# Patient Record
Sex: FEMALE | Race: WHITE | Employment: FULL TIME | URBAN - METROPOLITAN AREA
[De-identification: names, ages, dates, MRNs, and addresses within clinical notes are randomized per-mention and may not be internally consistent; named-entity substitution may affect disease eponyms.]

---

## 2017-05-15 ENCOUNTER — OFFICE VISIT (OUTPATIENT)
Dept: OBGYN CLINIC | Age: 57
End: 2017-05-15
Payer: COMMERCIAL

## 2017-05-15 VITALS
DIASTOLIC BLOOD PRESSURE: 80 MMHG | SYSTOLIC BLOOD PRESSURE: 102 MMHG | WEIGHT: 113.6 LBS | HEIGHT: 62 IN | BODY MASS INDEX: 20.91 KG/M2

## 2017-05-15 DIAGNOSIS — Z12.31 ENCOUNTER FOR SCREENING MAMMOGRAM FOR BREAST CANCER: ICD-10-CM

## 2017-05-15 DIAGNOSIS — Z01.419 ENCOUNTER FOR GYNECOLOGICAL EXAMINATION WITHOUT ABNORMAL FINDING: Primary | ICD-10-CM

## 2017-05-15 LAB — PAP SMEAR: NORMAL

## 2017-05-15 PROCEDURE — 99396 PREV VISIT EST AGE 40-64: CPT | Performed by: NURSE PRACTITIONER

## 2017-05-15 RX ORDER — LANOLIN ALCOHOL/MO/W.PET/CERES
400 CREAM (GRAM) TOPICAL DAILY
COMMUNITY
End: 2021-06-21

## 2017-05-15 RX ORDER — BIOTIN 1 MG
TABLET ORAL
COMMUNITY
End: 2021-06-21

## 2017-05-15 RX ORDER — MULTIVIT WITH MINERALS/LUTEIN
1000 TABLET ORAL DAILY
COMMUNITY
End: 2021-06-21

## 2017-05-15 ASSESSMENT — ENCOUNTER SYMPTOMS
SHORTNESS OF BREATH: 0
ABDOMINAL DISTENTION: 0
ABDOMINAL PAIN: 0
BACK PAIN: 0
CONSTIPATION: 0
COUGH: 0
DIARRHEA: 0

## 2017-05-22 DIAGNOSIS — Z01.419 ENCOUNTER FOR GYNECOLOGICAL EXAMINATION WITHOUT ABNORMAL FINDING: ICD-10-CM

## 2017-06-22 DIAGNOSIS — Z12.31 ENCOUNTER FOR SCREENING MAMMOGRAM FOR BREAST CANCER: ICD-10-CM

## 2018-05-18 ENCOUNTER — OFFICE VISIT (OUTPATIENT)
Dept: OBGYN CLINIC | Age: 58
End: 2018-05-18
Payer: COMMERCIAL

## 2018-05-18 VITALS
HEIGHT: 62 IN | WEIGHT: 109 LBS | SYSTOLIC BLOOD PRESSURE: 94 MMHG | BODY MASS INDEX: 20.06 KG/M2 | DIASTOLIC BLOOD PRESSURE: 62 MMHG

## 2018-05-18 DIAGNOSIS — Z12.31 ENCOUNTER FOR SCREENING MAMMOGRAM FOR BREAST CANCER: ICD-10-CM

## 2018-05-18 DIAGNOSIS — Z01.419 ENCOUNTER FOR GYNECOLOGICAL EXAMINATION: Primary | ICD-10-CM

## 2018-05-18 LAB — PAP SMEAR: NORMAL

## 2018-05-18 PROCEDURE — 99396 PREV VISIT EST AGE 40-64: CPT | Performed by: NURSE PRACTITIONER

## 2018-05-18 ASSESSMENT — ENCOUNTER SYMPTOMS
BACK PAIN: 0
CONSTIPATION: 0
COUGH: 0
DIARRHEA: 0
SHORTNESS OF BREATH: 0
ABDOMINAL PAIN: 0
ABDOMINAL DISTENTION: 0

## 2018-05-29 DIAGNOSIS — Z01.419 ENCOUNTER FOR GYNECOLOGICAL EXAMINATION: ICD-10-CM

## 2018-06-26 DIAGNOSIS — Z12.31 ENCOUNTER FOR SCREENING MAMMOGRAM FOR BREAST CANCER: ICD-10-CM

## 2019-05-30 ENCOUNTER — HOSPITAL ENCOUNTER (OUTPATIENT)
Age: 59
Setting detail: SPECIMEN
Discharge: HOME | End: 2019-05-30
Payer: COMMERCIAL

## 2019-05-30 ENCOUNTER — OFFICE VISIT (OUTPATIENT)
Dept: OBGYN CLINIC | Age: 59
End: 2019-05-30
Payer: COMMERCIAL

## 2019-05-30 VITALS
WEIGHT: 108.8 LBS | SYSTOLIC BLOOD PRESSURE: 92 MMHG | BODY MASS INDEX: 20.02 KG/M2 | DIASTOLIC BLOOD PRESSURE: 74 MMHG | HEIGHT: 62 IN

## 2019-05-30 DIAGNOSIS — Z12.31 ENCOUNTER FOR SCREENING MAMMOGRAM FOR BREAST CANCER: ICD-10-CM

## 2019-05-30 DIAGNOSIS — Z01.419 ENCOUNTER FOR GYNECOLOGICAL EXAMINATION: Primary | ICD-10-CM

## 2019-05-30 PROCEDURE — 99396 PREV VISIT EST AGE 40-64: CPT | Performed by: NURSE PRACTITIONER

## 2019-05-30 ASSESSMENT — ENCOUNTER SYMPTOMS
COUGH: 0
CONSTIPATION: 0
ABDOMINAL DISTENTION: 0
SHORTNESS OF BREATH: 0
ABDOMINAL PAIN: 0
BACK PAIN: 0
DIARRHEA: 0

## 2019-05-30 NOTE — PROGRESS NOTES
HPI:     Jennifer More a 63 y.o. female who presents today for:  Chief Complaint   Patient presents with    Annual Exam     last pap 5/18/18-WNL last mammogram 6/22/18-WNL        HPI  Here for annual exam. Works as a  at [employer] Has 3 children- 23/27/32. Eating healthy and exercising daily  GYNECOLOGICHISTORY:  MenstrualHistory: Patient's last menstrual period was 04/06/2011. Sexually Transmitted Infections: No  History of Ectopic Pregnancy:No  Denies VB  Sexually active: yes  Dyspareunia: none    Current Outpatient Medications   Medication Sig Dispense Refill    vitamin E 1000 UNITS capsule Take 1,000 Units by mouth daily      Biotin 1000 MCG TABS Take by mouth      folic acid (FOLVITE) 284 MCG tablet Take 400 mcg by mouth daily      TURMERIC PO Take by mouth      Omega 3-6-9 Fatty Acids (OMEGA 3-6-9 COMPLEX PO) Take  by mouth.  Probiotic Product (PROBIOTIC PO) Take  by mouth.  Calcium Carbonate-Vitamin D (CALCIUM + D PO) Take  by mouth.  diclofenac sodium (VOLTAREN) 1 % GEL Apply 2 g topically daily.  levothyroxine (LEVOTHROID) 50 MCG tablet Take 50 mcg by mouth daily.  buPROPion (WELLBUTRIN SR) 150 MG SR tablet Take 150 mg by mouth daily.  valacyclovir (VALTREX) 1 GM tablet Take 1,000 mg by mouth 2 times daily. Take as directed        No current facility-administered medications for this visit.       No Known Allergies    Past Medical History:   Diagnosis Date    Abnormal Pap smear 10/12/2001    REAGAN I, mild dysplasia    GERD (gastroesophageal reflux disease)     history of    Hypothyroidism     Migraine     Osteopenia 03/2008     Denies family history of breast, ovarian, uterus, colon CA     Past Surgical History:   Procedure Laterality Date    COLONOSCOPY  02/2017    COLPOSCOPY  12/2001    EYE SURGERY Right 11/2018    cornea replacement     OTHER SURGICAL HISTORY  3/9/90    cervical cone by laser ablation    TOTAL HIP ARTHROPLASTY      Left Hip Family History   Problem Relation Age of Onset    Hypertension Paternal Grandmother     Hypertension Maternal Grandmother     Cancer Father         thyroid    Depression Father     Heart Disease Father     Osteoporosis Mother     High Blood Pressure Mother     High Cholesterol Mother     Hypertension Brother     Breast Cancer Other      Social History     Tobacco Use    Smoking status: Never Smoker    Smokeless tobacco: Never Used   Substance Use Topics    Alcohol use: Yes     Alcohol/week: 1.8 oz     Types: 3 Glasses of wine per week        Subjective:      Review of Systems   Constitutional: Negative for appetite change and fatigue. HENT: Negative for congestion and hearing loss. Eyes: Negative for visual disturbance. Respiratory: Negative for cough and shortness of breath. Cardiovascular: Negative for chest pain and palpitations. Gastrointestinal: Negative for abdominal distention, abdominal pain, constipation and diarrhea. Genitourinary: Negative for flank pain, frequency, menstrual problem, pelvic pain and vaginal discharge. Musculoskeletal: Negative for back pain. Neurological: Negative for syncope and headaches. Psychiatric/Behavioral: Negative for behavioral problems. Objective:     BP 92/74 (Site: Right Upper Arm, Position: Sitting, Cuff Size: Medium Adult)   Ht 5' 1.75\" (1.568 m)   Wt 108 lb 12.8 oz (49.4 kg)   LMP 04/06/2011   Breastfeeding? No   BMI 20.06 kg/m²   Physical Exam   Constitutional: She is oriented to person, place, and time. She appears well-developed and well-nourished. Eyes: Pupils are equal, round, and reactive to light. Neck: No tracheal deviation present. No thyromegaly present. Cardiovascular: Normal rate, regular rhythm and normal heart sounds. Pulmonary/Chest: Effort normal and breath sounds normal. No respiratory distress. Right breast exhibits no inverted nipple.  Left breast exhibits no inverted nipple, no mass, no nipple discharge, no skin change and no tenderness. No breast tenderness, discharge or bleeding. Breasts are symmetrical.   Abdominal: Soft. Bowel sounds are normal. She exhibits no distension and no mass. There is no tenderness. There is no CVA tenderness. Hernia confirmed negative in the right inguinal area and confirmed negative in the left inguinal area. Genitourinary: No breast tenderness, discharge or bleeding. There is no rash or lesion on the right labia. There is no rash or lesion on the left labia. Uterus is not deviated, not enlarged and not fixed. Cervix exhibits no motion tenderness, no discharge and no friability. Right adnexum displays no mass, no tenderness and no fullness. Left adnexum displays no mass, no tenderness and no fullness. No tenderness in the vagina. No vaginal discharge found. Musculoskeletal: She exhibits no edema or tenderness. Lymphadenopathy:     She has no cervical adenopathy. Right: No inguinal adenopathy present. Left: No inguinal adenopathy present. Neurological: She is alert and oriented to person, place, and time. Skin: Skin is warm and dry. Psychiatric: She has a normal mood and affect. Her behavior is normal. Judgment and thought content normal.         Assessment:     1. Encounter for gynecological examination    2. Encounter for screening mammogram for breast cancer          Plan:   1. Pap collected. Discussed new pap smear guidelines. Desires re-pap in 1 year. 2. Screening mammogram discussed and advised yearly if within normal limits. 3. Calcium and Vitamin D dosing reviewed. 4. Colonoscopy screening reviewed. 5. Bone density testing reviewed.      Electronicallysigned by Zack Zimmer on 5/30/2019

## 2019-05-31 ENCOUNTER — TELEPHONE (OUTPATIENT)
Dept: OBGYN CLINIC | Age: 59
End: 2019-05-31

## 2019-05-31 RX ORDER — VALACYCLOVIR HYDROCHLORIDE 1 G/1
1000 TABLET, FILM COATED ORAL 2 TIMES DAILY
Qty: 14 TABLET | Refills: 5 | Status: SHIPPED | OUTPATIENT
Start: 2019-05-31 | End: 2020-07-29

## 2019-05-31 NOTE — TELEPHONE ENCOUNTER
62 y/o GYN seen per Maya on 05/30/19 for her annual.    Pt calling is requesting refill of Valtrex. Pt states she didn't realize she does need refills. Please send to [address].

## 2019-06-04 LAB — CYTOLOGY REPORT: NORMAL

## 2019-08-08 DIAGNOSIS — M25.552 HIP PAIN, LEFT: Primary | ICD-10-CM

## 2019-08-09 ENCOUNTER — OFFICE VISIT (OUTPATIENT)
Dept: ORTHOPEDIC SURGERY | Age: 59
End: 2019-08-09
Payer: COMMERCIAL

## 2019-08-09 DIAGNOSIS — Z96.649 HISTORY OF REVISION OF TOTAL HIP ARTHROPLASTY: Primary | ICD-10-CM

## 2019-08-09 PROCEDURE — G8420 CALC BMI NORM PARAMETERS: HCPCS | Performed by: ORTHOPAEDIC SURGERY

## 2019-08-09 PROCEDURE — 3017F COLORECTAL CA SCREEN DOC REV: CPT | Performed by: ORTHOPAEDIC SURGERY

## 2019-08-09 PROCEDURE — 1036F TOBACCO NON-USER: CPT | Performed by: ORTHOPAEDIC SURGERY

## 2019-08-09 PROCEDURE — G8427 DOCREV CUR MEDS BY ELIG CLIN: HCPCS | Performed by: ORTHOPAEDIC SURGERY

## 2019-08-09 PROCEDURE — 99213 OFFICE O/P EST LOW 20 MIN: CPT | Performed by: ORTHOPAEDIC SURGERY

## 2020-06-12 ENCOUNTER — OFFICE VISIT (OUTPATIENT)
Dept: OBGYN CLINIC | Age: 60
End: 2020-06-12
Payer: COMMERCIAL

## 2020-06-12 VITALS
HEIGHT: 62 IN | SYSTOLIC BLOOD PRESSURE: 106 MMHG | BODY MASS INDEX: 20.28 KG/M2 | DIASTOLIC BLOOD PRESSURE: 74 MMHG | WEIGHT: 110.2 LBS

## 2020-06-12 PROCEDURE — 99396 PREV VISIT EST AGE 40-64: CPT | Performed by: NURSE PRACTITIONER

## 2020-06-12 ASSESSMENT — ENCOUNTER SYMPTOMS
ABDOMINAL DISTENTION: 0
BACK PAIN: 0
SHORTNESS OF BREATH: 0
CONSTIPATION: 0
DIARRHEA: 0
COUGH: 0
ABDOMINAL PAIN: 0

## 2020-06-12 NOTE — PROGRESS NOTES
Relation Age of Onset    Hypertension Paternal Grandmother     Hypertension Maternal Grandmother     Cancer Father         thyroid    Depression Father     Heart Disease Father     Osteoporosis Mother     High Blood Pressure Mother     High Cholesterol Mother     Hypertension Brother     Breast Cancer Other      Social History     Tobacco Use    Smoking status: Never Smoker    Smokeless tobacco: Never Used   Substance Use Topics    Alcohol use: Yes     Alcohol/week: 3.0 standard drinks     Types: 3 Glasses of wine per week        Subjective:      Review of Systems   Constitutional: Negative for appetite change and fatigue. HENT: Negative for congestion and hearing loss. Eyes: Negative for visual disturbance. Respiratory: Negative for cough and shortness of breath. Cardiovascular: Negative for chest pain and palpitations. Gastrointestinal: Negative for abdominal distention, abdominal pain, constipation and diarrhea. Genitourinary: Negative for flank pain, frequency, menstrual problem, pelvic pain and vaginal discharge. Musculoskeletal: Negative for back pain. Neurological: Negative for syncope and headaches. Psychiatric/Behavioral: Negative for behavioral problems. Objective:     /74 (Site: Right Upper Arm, Position: Sitting, Cuff Size: Medium Adult)   Ht 5' 1.75\" (1.568 m)   Wt 110 lb 3.2 oz (50 kg)   LMP 04/06/2011   Breastfeeding No   BMI 20.32 kg/m²   Physical Exam  Constitutional:       Appearance: She is well-developed. Eyes:      Pupils: Pupils are equal, round, and reactive to light. Neck:      Thyroid: No thyromegaly. Trachea: No tracheal deviation. Cardiovascular:      Rate and Rhythm: Normal rate and regular rhythm. Heart sounds: Normal heart sounds. Pulmonary:      Effort: Pulmonary effort is normal. No respiratory distress. Breath sounds: Normal breath sounds.    Chest:      Breasts: Breasts are symmetrical.         Right: No

## 2020-07-07 ENCOUNTER — TELEPHONE (OUTPATIENT)
Dept: OBGYN CLINIC | Age: 60
End: 2020-07-07

## 2020-07-29 RX ORDER — VALACYCLOVIR HYDROCHLORIDE 1 G/1
TABLET, FILM COATED ORAL
Qty: 14 TABLET | Refills: 5 | Status: SHIPPED | OUTPATIENT
Start: 2020-07-29 | End: 2021-06-21 | Stop reason: SDUPTHER

## 2020-10-28 ENCOUNTER — OFFICE VISIT (OUTPATIENT)
Dept: OBGYN CLINIC | Age: 60
End: 2020-10-28
Payer: COMMERCIAL

## 2020-10-28 VITALS
BODY MASS INDEX: 20.06 KG/M2 | DIASTOLIC BLOOD PRESSURE: 70 MMHG | SYSTOLIC BLOOD PRESSURE: 110 MMHG | WEIGHT: 109 LBS | HEIGHT: 62 IN

## 2020-10-28 PROCEDURE — 99213 OFFICE O/P EST LOW 20 MIN: CPT | Performed by: NURSE PRACTITIONER

## 2020-10-28 ASSESSMENT — ENCOUNTER SYMPTOMS
ABDOMINAL DISTENTION: 0
COUGH: 0
CONSTIPATION: 0
SHORTNESS OF BREATH: 0
DIARRHEA: 0
BACK PAIN: 0
ABDOMINAL PAIN: 0

## 2020-10-28 NOTE — PROGRESS NOTES
PO Take by mouth      Omega 3-6-9 Fatty Acids (OMEGA 3-6-9 COMPLEX PO) Take  by mouth.  Probiotic Product (PROBIOTIC PO) Take  by mouth.  Calcium Carbonate-Vitamin D (CALCIUM + D PO) Take  by mouth.  diclofenac sodium (VOLTAREN) 1 % GEL Apply 2 g topically daily.  levothyroxine (LEVOTHROID) 50 MCG tablet Take 50 mcg by mouth daily.  buPROPion (WELLBUTRIN SR) 150 MG SR tablet Take 150 mg by mouth daily. No current facility-administered medications for this visit. No Known Allergies    Objective:   Physical Exam  Constitutional:       Appearance: Normal appearance. She is normal weight. HENT:      Head: Normocephalic. Eyes:      Extraocular Movements: Extraocular movements intact. Conjunctiva/sclera: Conjunctivae normal.   Neck:      Musculoskeletal: Normal range of motion. Pulmonary:      Effort: Pulmonary effort is normal.   Chest:      Breasts:         Right: Normal. No swelling, bleeding, inverted nipple, mass, nipple discharge, skin change or tenderness. Left: Tenderness present. No swelling, bleeding, inverted nipple, mass, nipple discharge or skin change. Abdominal:      General: Abdomen is flat. Palpations: Abdomen is soft. Musculoskeletal: Normal range of motion. General: Tenderness (left axillary) present. Neurological:      General: No focal deficit present. Mental Status: She is alert and oriented to person, place, and time. Mental status is at baseline. Skin:     General: Skin is warm and dry. Psychiatric:         Mood and Affect: Mood normal.         Behavior: Behavior normal.         Thought Content: Thought content normal.         Judgment: Judgment normal.         Assessment:      1. Left axillary pain          Plan:   Left breast US  Warm compresses tid  Consider NSAIDs for a short period of time   RTO annual exam and prn  No follow-ups on file.         Ayse Sheikh, APRN - CNP

## 2021-06-21 ENCOUNTER — OFFICE VISIT (OUTPATIENT)
Dept: OBGYN CLINIC | Age: 61
End: 2021-06-21
Payer: COMMERCIAL

## 2021-06-21 VITALS
DIASTOLIC BLOOD PRESSURE: 60 MMHG | BODY MASS INDEX: 21.79 KG/M2 | SYSTOLIC BLOOD PRESSURE: 94 MMHG | HEIGHT: 60 IN | WEIGHT: 111 LBS

## 2021-06-21 DIAGNOSIS — Z12.31 ENCOUNTER FOR SCREENING MAMMOGRAM FOR BREAST CANCER: ICD-10-CM

## 2021-06-21 DIAGNOSIS — Z01.419 ENCOUNTER FOR GYNECOLOGICAL EXAMINATION: Primary | ICD-10-CM

## 2021-06-21 PROBLEM — M21.942 ACQUIRED DEFORMITY OF BOTH HANDS: Status: ACTIVE | Noted: 2019-06-12

## 2021-06-21 PROBLEM — M93.90 OSTEOCHONDROPATHY: Status: ACTIVE | Noted: 2021-06-21

## 2021-06-21 PROBLEM — M21.941 ACQUIRED DEFORMITY OF BOTH HANDS: Status: ACTIVE | Noted: 2019-06-12

## 2021-06-21 PROBLEM — D72.819 CHRONIC LEUKOPENIA: Status: ACTIVE | Noted: 2020-04-22

## 2021-06-21 PROBLEM — E87.6 HYPOKALEMIA: Status: ACTIVE | Noted: 2021-06-21

## 2021-06-21 LAB — PAP SMEAR: NORMAL

## 2021-06-21 PROCEDURE — 99396 PREV VISIT EST AGE 40-64: CPT | Performed by: NURSE PRACTITIONER

## 2021-06-21 RX ORDER — VALACYCLOVIR HYDROCHLORIDE 1 G/1
TABLET, FILM COATED ORAL
Qty: 14 TABLET | Refills: 5 | Status: SHIPPED | OUTPATIENT
Start: 2021-06-21

## 2021-06-21 ASSESSMENT — ENCOUNTER SYMPTOMS
ABDOMINAL PAIN: 0
BACK PAIN: 0
COUGH: 0
SHORTNESS OF BREATH: 0
CONSTIPATION: 0
ABDOMINAL DISTENTION: 0
DIARRHEA: 0

## 2021-06-21 NOTE — PROGRESS NOTES
HPI:     Trevor Bacon a 62 y.o. female who presents today for:  Chief Complaint   Patient presents with    Annual Exam     last pap 6/12/20-WNL last mammogram 7/22/20-WNL        HPI  Here for annual exam. Works as a  at [employer] in [address] [place] [address]. Has 3 children- 34/30/25. Exercises and eating very healthy. Osteoporosis managed by Dr. Murl Spurling. Mom had BrCa- negative BRCA. GYNECOLOGICHISTORY:  MenstrualHistory: Patient's last menstrual period was 04/06/2011. Sexually Transmitted Infections: No  History of Ectopic Pregnancy:No  Denies VB  Sexually active: not currently  Dyspareunia: NA    Current Outpatient Medications   Medication Sig Dispense Refill    valACYclovir (VALTREX) 1 g tablet TAKE ONE TABLET BY MOUTH TWICE DAILY AS DIRECTED FOR SEVEN DAYS 14 tablet 5    vitamin E 1000 UNITS capsule Take 1,000 Units by mouth daily      Biotin 1000 MCG TABS Take by mouth      folic acid (FOLVITE) 176 MCG tablet Take 400 mcg by mouth daily      TURMERIC PO Take by mouth      Omega 3-6-9 Fatty Acids (OMEGA 3-6-9 COMPLEX PO) Take  by mouth.  Probiotic Product (PROBIOTIC PO) Take  by mouth.  Calcium Carbonate-Vitamin D (CALCIUM + D PO) Take  by mouth.  diclofenac sodium (VOLTAREN) 1 % GEL Apply 2 g topically daily.  levothyroxine (LEVOTHROID) 50 MCG tablet Take 50 mcg by mouth daily.  buPROPion (WELLBUTRIN SR) 150 MG SR tablet Take 150 mg by mouth daily. No current facility-administered medications for this visit.      No Known Allergies    Past Medical History:   Diagnosis Date    Abnormal Pap smear 10/12/2001    REAGAN I, mild dysplasia    GERD (gastroesophageal reflux disease)     history of    Hypothyroidism     Migraine     Osteopenia 03/2008     Denies family history of breast, ovarian, uterus, colon CA     Past Surgical History:   Procedure Laterality Date    COLONOSCOPY  02/2017    COLPOSCOPY  12/2001    EYE SURGERY Right 11/2018    cornea discharge, skin change or tenderness. Abdominal:      General: There is no distension. Palpations: Abdomen is soft. There is no mass. Tenderness: There is no abdominal tenderness. Genitourinary:     Labia:         Right: No rash or lesion. Left: No rash or lesion. Vagina: No vaginal discharge or tenderness. Cervix: No cervical motion tenderness, discharge or friability. Uterus: Not deviated, not enlarged and not fixed. Adnexa:         Right: No mass, tenderness or fullness. Left: No mass, tenderness or fullness. Musculoskeletal:         General: No tenderness. Normal range of motion. Cervical back: Normal range of motion. Skin:     General: Skin is warm and dry. Neurological:      General: No focal deficit present. Mental Status: She is alert and oriented to person, place, and time. Mental status is at baseline. Psychiatric:         Mood and Affect: Mood normal.         Behavior: Behavior normal.         Thought Content: Thought content normal.         Judgment: Judgment normal.           Assessment:     1. Encounter for gynecological examination    2. Encounter for screening mammogram for breast cancer          Plan:   1. Pap collected. Discussed new pap smear guidelines. Desires re-pap in 1 year. 2. Screening mammogram discussed and advised yearly if within normal limits. 3. Calcium and Vitamin D dosing reviewed. 4. Colonoscopy screening reviewed. 5. Bone density testing reviewed.      Electronicallysigned by RODRIGO Coleman CNP on 6/21/2021

## 2021-06-24 DIAGNOSIS — Z01.419 ENCOUNTER FOR GYNECOLOGICAL EXAMINATION: ICD-10-CM

## 2021-08-17 DIAGNOSIS — Z12.31 ENCOUNTER FOR SCREENING MAMMOGRAM FOR BREAST CANCER: ICD-10-CM

## 2021-12-07 NOTE — PROGRESS NOTES
Subjective:      Patient ID: Qasim Hammer is being seen today for No chief complaint on file. HPI  Here for concerns about ovarian cancer. Has been having some GI issues- indigestion, constipation, diarrhea. Had been previously dx w/IBS. Had been on fosamax and Dr. Tonny Fry is changing to Prolia. Has VB twice in the last 2 months. Was a pink streak on her toilet paper. Has seen GI and had recommended endoscopy, which she didn't want to do. Has been eating well- plenty of fiber and water and stays very active. Discussed pelvic US w/EMB to follow for VB and will evaluate ovaries. Review of Systems   Constitutional: Negative for appetite change and fatigue. HENT: Negative for congestion and hearing loss. Eyes: Negative for visual disturbance. Respiratory: Negative for cough and shortness of breath. Cardiovascular: Negative for chest pain and palpitations. Gastrointestinal: Positive for abdominal distention, constipation and diarrhea. Negative for abdominal pain. Genitourinary: Positive for vaginal bleeding (x 2). Negative for flank pain, frequency, menstrual problem, pelvic pain and vaginal discharge. Musculoskeletal: Negative for back pain. Neurological: Negative for syncope and headaches. Psychiatric/Behavioral: Negative for behavioral problems. There were no vitals filed for this visit. Current Outpatient Medications   Medication Sig Dispense Refill    valACYclovir (VALTREX) 1 g tablet TAKE ONE TABLET BY MOUTH TWICE DAILY AS DIRECTED FOR SEVEN DAYS 14 tablet 5    TURMERIC PO Take by mouth      Omega 3-6-9 Fatty Acids (OMEGA 3-6-9 COMPLEX PO) Take  by mouth.  Probiotic Product (PROBIOTIC PO) Take  by mouth.  Calcium Carbonate-Vitamin D (CALCIUM + D PO) Take  by mouth.  levothyroxine (LEVOTHROID) 50 MCG tablet Take 50 mcg by mouth daily.  buPROPion (WELLBUTRIN SR) 150 MG SR tablet Take 150 mg by mouth daily.          No current facility-administered medications for this visit. No Known Allergies  Patient Active Problem List   Diagnosis    History of PCR DNA positive for HSV1    Depression    Foot neuroma    Hypothyroid    Acquired deformity of both hands    Chronic leukopenia    Hypokalemia    Keratoconus of both eyes    Nuclear cataract of both eyes    Osteochondropathy        Objective:   Physical Exam  Constitutional:       Appearance: Normal appearance. She is normal weight. HENT:      Head: Normocephalic. Eyes:      Extraocular Movements: Extraocular movements intact. Conjunctiva/sclera: Conjunctivae normal.   Pulmonary:      Effort: Pulmonary effort is normal.   Abdominal:      General: Abdomen is flat. Palpations: Abdomen is soft. Musculoskeletal:         General: Normal range of motion. Cervical back: Normal range of motion. Neurological:      General: No focal deficit present. Mental Status: She is alert and oriented to person, place, and time. Mental status is at baseline. Skin:     General: Skin is warm and dry. Psychiatric:         Mood and Affect: Mood normal.         Behavior: Behavior normal.         Thought Content: Thought content normal.         Judgment: Judgment normal.         Assessment:      1. Abdominal bloating    2. Vaginal bleeding          Plan:      Patient is a 62 y.o. [id number]  seen with total face to face time of 15 minutes. More than 50% of this visit was on counseling and education regarding her    Diagnosis Orders   1. Abdominal bloating  US PELVIS COMPLETE   2. Vaginal bleeding  US PELVIS COMPLETE    and her options. She was also counseled on her preventative health maintenance recommendations and follow-up of VB. Refer to plan and assessment.     Recent Results (from the past 8736 hour(s))   PAP SMEAR    Collection Time: 06/21/21 12:00 AM   Result Value Ref Range    Pap Negative for intraephithelial lesion or malignancy Negative for intraephithelial lesion or malignancy, Other PLAN:   Pelvic US  EMB  FU w/PCP and Gi

## 2021-12-08 ENCOUNTER — OFFICE VISIT (OUTPATIENT)
Dept: OBGYN CLINIC | Age: 61
End: 2021-12-08
Payer: COMMERCIAL

## 2021-12-08 VITALS
SYSTOLIC BLOOD PRESSURE: 96 MMHG | DIASTOLIC BLOOD PRESSURE: 68 MMHG | WEIGHT: 113 LBS | HEIGHT: 62 IN | BODY MASS INDEX: 20.8 KG/M2

## 2021-12-08 DIAGNOSIS — N95.0 PMB (POSTMENOPAUSAL BLEEDING): ICD-10-CM

## 2021-12-08 DIAGNOSIS — R14.0 ABDOMINAL BLOATING: Primary | ICD-10-CM

## 2021-12-08 DIAGNOSIS — N93.9 VAGINAL BLEEDING: ICD-10-CM

## 2021-12-08 PROBLEM — M81.0 POST-MENOPAUSAL OSTEOPOROSIS: Status: ACTIVE | Noted: 2021-08-17

## 2021-12-08 PROCEDURE — 99213 OFFICE O/P EST LOW 20 MIN: CPT | Performed by: NURSE PRACTITIONER

## 2021-12-08 ASSESSMENT — ENCOUNTER SYMPTOMS
CONSTIPATION: 1
BACK PAIN: 0
ABDOMINAL PAIN: 0
COUGH: 0
ABDOMINAL DISTENTION: 1
SHORTNESS OF BREATH: 0
DIARRHEA: 1

## 2021-12-08 NOTE — PATIENT INSTRUCTIONS
Patient Education        Endometrial Biopsy: About This Test  What is it? An endometrial biopsy is a way for your doctor to take a small sample of the lining of the uterus (endometrium). The sample is looked at under a microscope for abnormal cells. An endometrial biopsy helps your doctor find problems in the endometrium. Why is this test done? An endometrial biopsy is done to check for cancer of the uterus. The test is also done if you have abnormal bleeding from your uterus. The test results show how your body's hormones are affecting the lining of the uterus, such as during menopause. How do you prepare for the test?  · If you take aspirin or some other blood thinner, ask your doctor if you should stop taking it before your test. Make sure that you understand exactly what your doctor wants you to do. These medicines increase the risk of bleeding. · Ask your doctor if you should take a pain reliever, such as ibuprofen (Advil or Motrin), 30 to 60 minutes before the test. This can help reduce any cramping pain that the test can cause. How is the test done? · You will lie on an exam table. Your feet will be in stirrups. · The doctor may use a spray or injection to numb your cervix. The cervix is the opening to the uterus. · The doctor will use a tool called a speculum to see the cervix. · Then the doctor will pass a thin tube through the cervix to take a sample of the uterus lining. · The sample is sent to a lab. How long does the test take? The test will take about 5 to 15 minutes. What happens after the test?  · You will probably be able to go home right away. · You likely will have mild vaginal bleeding and may have cramps for a few days after the test. The cramps may feel like bad menstrual cramps. How can you care for yourself at home? · Ask your doctor if you can take an over-the-counter pain medicine, such as acetaminophen (Tylenol), ibuprofen (Advil, Motrin), or naproxen (Aleve).  Be safe with medicines. Read and follow all instructions on the label. · Use pads for vaginal bleeding or discharge. · You may return to all your usual activities (including sex) when you feel like it. Follow-up care is a key part of your treatment and safety. Be sure to make and go to all appointments, and call your doctor if you are having problems. It's also a good idea to keep a list of the medicines you take. Ask your doctor when you can expect to have your test results. Where can you learn more? Go to [email or web address] org and sign in to your [facility] account. Enter [phone] in the [employer] box to learn more about \"Endometrial Biopsy: About This Test.\"     If you do not have an account, please click on the \"Sign Up Now\" link. Current as of: February 11, 2021               Content Version: 13.0  © 1855-7589 [employer]. Care instructions adapted under license by [facility] ([facility]). If you have questions about a medical condition or this instruction, always ask your healthcare professional. [place] [address] any warranty or liability for your use of this information.

## 2022-01-21 ENCOUNTER — OFFICE VISIT (OUTPATIENT)
Dept: ORTHOPEDIC SURGERY | Age: 62
End: 2022-01-21
Payer: COMMERCIAL

## 2022-01-21 DIAGNOSIS — M25.552 LEFT HIP PAIN: Primary | ICD-10-CM

## 2022-01-21 PROCEDURE — 99213 OFFICE O/P EST LOW 20 MIN: CPT | Performed by: ORTHOPAEDIC SURGERY

## 2022-01-21 NOTE — PROGRESS NOTES
Alea Lagos M.D.            [address] [facility] [address] [facility],[address], [address] [facility]           Dept Phone: [phone]           Dept Fax:  [phone], [place]          Dept Phone: [phone]           Dept Fax:  [phone]      Chief Compliant:  Chief Complaint   Patient presents with    Pain     Lt hip        History of Present Illness: This is a 62 y.o. female who presents to the clinic today for evaluation / follow up of 20 years status post left total hip for acetabular dysplasia. Patient states that she has very occasional disc comfort but basically 90% of time she has no pain whatsoever she is very active and is doing all her daily activities. .       Review of Systems   Constitutional: Negative for fever, chills, sweats. Eyes: Negative for changes in vision, or pain. HENT: Negative for ear ache, epistaxis, or sore throat. Respiratory/Cardio: Negative for Chest pain, palpitations, SOB, or cough. Gastrointestinal: Negative for abdominal pain, N/V/D. Genitourinary: Negative for dysuria, frequency, urgency, or hematuria. Neurological: Negative for headache, numbness, or weakness. Integumentary: Negative for rash, itching, laceration, or abrasion. Musculoskeletal: Positive for Pain (Lt hip)       Physical Exam:  Constitutional: Patient is oriented to person, place, and time. Patient appears well-developed and well nourished. HENT: Negative otherwise noted  Head: Normocephalic and Atraumatic  Nose: Normal  Eyes: Conjunctivae and EOM are normal  Neck: Normal range of motion Neck supple. Respiratory/Cardio: Effort normal. No respiratory distress. Musculoskeletal:    Notes I can motion the patient's to the left hip indiscriminately without any discomfort whatsoever.   She has no pain on internal or external rotation has no pain on [place] she has no trochanteric findings overall very benign examination  Neurological: Patient is alert and oriented to person, place, and time. Normal strenght. No sensory deficit. Skin: Skin is warm and dry  Psychiatric: Behavior is normal. Thought content normal.  Nursing note and vitals reviewed. Labs and Imaging:     XR taken today:  XR HIP 1 VW W PELVIS LEFT    Result Date: 1/21/2022  X-rays taken today reviewed by me show standing AP of the pelvis. Patient status post left total of arthroplasty. Patient has a noncemented stem that appears to be stable when compared with views from taken in 2019. She has a single cerclage wire. Patient does have some modest femoral head E centricity somewhat superiorly however this again remains unchanged from those views taken 2019. Patient also has acetabular cyst with the surrounding the screws and this is not expansile or progressive in any way          Orders Placed This Encounter   Procedures    XR HIP 1 VW W PELVIS LEFT     Standing Status:   Future     Number of Occurrences:   1     Standing Expiration Date:   1/21/2023       Assessment and Plan:  1. Left hip pain            This is a 62 y.o. female who presents to the clinic today for evaluation / follow up of 20 years status post left total hip. Past History:    Current Outpatient Medications:     valACYclovir (VALTREX) 1 g tablet, TAKE ONE TABLET BY MOUTH TWICE DAILY AS DIRECTED FOR SEVEN DAYS, Disp: 14 tablet, Rfl: 5    TURMERIC PO, Take by mouth, Disp: , Rfl:     Omega 3-6-9 Fatty Acids (OMEGA 3-6-9 COMPLEX PO), Take  by mouth., Disp: , Rfl:     Probiotic Product (PROBIOTIC PO), Take  by mouth., Disp: , Rfl:     Calcium Carbonate-Vitamin D (CALCIUM + D PO), Take  by mouth., Disp: , Rfl:     levothyroxine (LEVOTHROID) 50 MCG tablet, Take 50 mcg by mouth daily. , Disp: , Rfl:     buPROPion (WELLBUTRIN SR) 150 MG SR tablet, Take 150 mg by mouth daily.   , Disp: , Rfl:   Allergies Allergen Reactions    Mixed Ragweed     Molds & Smuts      Social History     Socioeconomic History    Marital status: Single     Spouse name: Not on file    Number of children: Not on file    Years of education: Not on file    Highest education level: Not on file   Occupational History    Not on file   Tobacco Use    Smoking status: Never Smoker    Smokeless tobacco: Never Used   Vaping Use    Vaping Use: Never used   Substance and Sexual Activity    Alcohol use: Yes     Alcohol/week: 3.0 standard drinks     Types: 3 Glasses of wine per week    Drug use: No    Sexual activity: Yes     Partners: Male   Other Topics Concern    Not on file   Social History Narrative    Not on file     Social Determinants of Health     Financial Resource Strain:     Difficulty of Paying Living Expenses: Not on file   Food Insecurity:     Worried About Running Out of Food in the Last Year: Not on file    Jitendra of Food in the Last Year: Not on file   Transportation Needs:     Lack of Transportation (Medical): Not on file    Lack of Transportation (Non-Medical):  Not on file   Physical Activity:     Days of Exercise per Week: Not on file    Minutes of Exercise per Session: Not on file   Stress:     Feeling of Stress : Not on file   Social Connections:     Frequency of Communication with Friends and Family: Not on file    Frequency of Social Gatherings with Friends and Family: Not on file    Attends Cheondoism Services: Not on file    Active Member of Clubs or Organizations: Not on file    Attends Club or Organization Meetings: Not on file    Marital Status: Not on file   Intimate Partner Violence:     Fear of Current or Ex-Partner: Not on file    Emotionally Abused: Not on file    Physically Abused: Not on file    Sexually Abused: Not on file   Housing Stability:     Unable to Pay for Housing in the Last Year: Not on file    Number of [place] in the Last Year: Not on file    Unstable Housing in the Last Year: Not on file     Past Medical History:   Diagnosis Date    Abnormal Pap smear 10/12/2001    REAGAN I, mild dysplasia    GERD (gastroesophageal reflux disease)     history of    Hypothyroidism     Migraine     Osteopenia 03/2008     Past Surgical History:   Procedure Laterality Date    COLONOSCOPY  02/2017    COLPOSCOPY  12/2001    EYE SURGERY Right 11/2018    cornea replacement     OTHER SURGICAL HISTORY  3/9/90    cervical cone by laser ablation    TOTAL HIP ARTHROPLASTY      Left Hip     Family History   Problem Relation Age of Onset    Hypertension Paternal Grandmother     Hypertension Maternal Grandmother     Cancer Father         thyroid    Depression Father     Heart Disease Father     Osteoporosis Mother     High Blood Pressure Mother     High Cholesterol Mother     Hypertension Brother     Breast Cancer Other    Plan  I did inform the patient that I do not see any progressive change in the polyethylene wear those views taken in 2019 to those did today. That she overall is doing well symptomatically. If she becomes symptomatic then she is to let me know otherwise she should follow her up in annual basis as I did explain to her at some point in future were probably looking at a possible polyethylene liner exchange      Provider Attestation:  Tremaine Brown, personally performed the services described in this documentation. All medical record entries made by the scribe were at my direction and in my presence. I have reviewed the chart and discharge instructions and agree that the records reflect my personal performance and is accurate and complete. Jessica Olvera MD. 01/21/22      Please note that this chart was generated using voice recognition Dragon dictation software. Although every effort was made to ensure the accuracy of this automated transcription, some errors in transcription may have occurred.

## 2022-07-23 NOTE — PROGRESS NOTES
DATE OF VISIT:  22        History and Physical    Ambrosio Paulson    :  1960  CHIEF COMPLAINT:    Chief Complaint   Patient presents with    Annual Exam     Here for annual Last pap 19 neg last mammogram 21 neg  seen [facility] in the [employer]                   HPI :   Ambrosio Paulson is a 62 y.o. Aisha Padmini [address]           PCP:RODRIGO Gutierrez - NANNETTE      Ambrosio Paulson returns today for her annual exam.  She is a former patient of Arie Coyne CNP who was last seen 2021 with PMB. TVS was performed and EMB was never obtained? She is having regular bowel movements without constipation or diarrhea. She has some minor involuntary loss of urine and does do Kegel exercises regularly. She is otherwise without any significant complaints today. Kirit Carcamo is currently employed at [employer] as a  in the [employer]. She has received her COVID vacs x2 plus booster. Carrie Cardona is due for her mammogram and she will be seeing her endocrinologist who prescribes her Fosamax and is going to order a follow-up DEXA scan. EXAMINATION:   TRANSABDOMINAL AND TRANSVAGINAL PELVIC ULTRASOUND       2021       TECHNIQUE:   Transabdominal and transvaginal pelvic ultrasound was performed. COMPARISON:   None       HISTORY:   ORDERING SYSTEM PROVIDED HISTORY: Abdominal bloating   TECHNOLOGIST PROVIDED HISTORY:   This procedure can be scheduled via [employer]. Access your [employer] account by   visiting [email or web address].   VB x 2       FINDINGS:       Measurements:       Uterus: Normal in size for age measuring 5.8 x 4.0 x 2.3 cm (28 cubic   centimetres). Endometrial stripe: Normal in thickness for age measuring 1.9 mm. Right Ovary:Normal in size measuring 2.0 x 1.5 x 1.2 cm (1.8 cubic   centimetres). Left Ovary: Normal in size measuring 1.5 x 1.5 x 1.4 cm) 1.6 cubic   centimetres) with a simple appearing follicle measuring 9.5 x 6.8 mm. Ultrasound Findings:       Uterus: Uterus demonstrates normal myometrial echotexture. Endometrial stripe: Endometrial stripe is within normal limits. Right Ovary: Right ovary is within normal limits. Left Ovary:  Left ovary is within normal limits. Free Fluid: No evidence of free fluid. Impression   Unremarkable pelvic ultrasound. RECOMMENDATIONS:   Unavailable     _____________________________________________________________________  Past Medical History:   Diagnosis Date    Abnormal Pap smear 10/12/2001    REAGAN I, mild dysplasia    GERD (gastroesophageal reflux disease)     history of    Hypothyroidism     Migraine     Osteopenia 03/2008                                                                   Past Surgical History:   Procedure Laterality Date    COLONOSCOPY  02/2017    COLPOSCOPY  12/2001    EYE SURGERY Right 11/2018    cornea replacement     OTHER SURGICAL HISTORY  3/9/90    cervical cone by laser ablation    TOTAL HIP ARTHROPLASTY      Left Hip     Family History   Problem Relation Age of Onset    Hypertension Paternal Grandmother     Hypertension Maternal Grandmother     Cancer Father         thyroid    Depression Father     Heart Disease Father     Osteoporosis Mother     High Blood Pressure Mother     High Cholesterol Mother     Hypertension Brother     Breast Cancer Other      Social History     Tobacco Use   Smoking Status Never   Smokeless Tobacco Never     Social History     Substance and Sexual Activity   Alcohol Use Yes    Alcohol/week: 3.0 standard drinks    Types: 3 Glasses of wine per week     Current Outpatient Medications   Medication Sig Dispense Refill    valACYclovir (VALTREX) 1 g tablet TAKE ONE TABLET BY MOUTH TWICE DAILY AS DIRECTED FOR SEVEN DAYS 14 tablet 5    TURMERIC PO Take by mouth      Omega 3-6-9 Fatty Acids (OMEGA 3-6-9 COMPLEX PO) Take  by mouth. Probiotic Product (PROBIOTIC PO) Take  by mouth.       Calcium Carbonate-Vitamin D (CALCIUM + D PO) Take  by mouth.      levothyroxine (LEVOTHROID) 50 MCG tablet Take 50 mcg by mouth daily. buPROPion (WELLBUTRIN SR) 150 MG SR tablet Take 150 mg by mouth daily. No current facility-administered medications for this visit. Allergies: Allergies   Allergen Reactions    Mixed Ragweed     Molds & Smuts        Gynecologic History:  Patient's last menstrual period was 2011. Sexually Active: Yes  STD History: No  Abnormal Pap History yes  Birth Control: No    OB History    Para Term  AB Living   5 3 0 0 2 3   SAB IAB Ectopic Molar Multiple Live Births   0 0 0 0 0 3     ______________________________________________________________________  REVIEW OF SYSTEMS:        Constitutional:  Unexpected weight change no  Neurological:  Frequent headaches  no  Ophthalmic:  Recent visual changes no  ENT:   Difficulty swallowing  no  Breast:              Masses   no     Respiratory:  Shortness of breath  no    Cardiovascular: Chest pain   no     Gastrointestinal: Chronic diarrhea/constipation no   Urogenital:  Urinary incontinence  yes                                         Heavy/irregular periods           no                                      Vaginal discharge                   no  Hematological: Bruises easy   no     Endocrine:  Nipple Discharge  no     Hot/Cold Intolerance  no   Psychological:  Mood and affect were wnl yes                                                                                                                                           Physical Exam:    There were no vitals filed for this visit. General Appearance:  She does not appear to be in any distress. This  is a well developed, well nourished, well groomed female. Neurological:  The patient is alert and oriented to time, place, person, and situation without any noted sensory motor deficits. Skin:  A brief inspection of the skin revealed no rashes or lesions.      Neck:  The neck was supple. There is no tracheal deviation, thyromegaly or supraclavicular adenopathy appreciated. Breast:   The patients breasts were symmetrical.  Breasts are nontender and there  were no masses, discharge or pathologic skin changes. There is no supraclavicular or axillary adenopathy bilaterally. Respiratory: There was unlabored respiratory effort. Lungs clear to ascultation without wheezes, rales or rhonchi in all fields bilaterally. Cardiovascular:  Normal sinus rhythm with a regular rate and without murmur, rubs or gallops. Abdomen: The abdomen was soft and non-tender with no guarding, rebound, CVAT or rigidity. No hernias were appreciated. Bowel sounds were normally active. Pelvic exam:  No vulvar, vaginal or cervical lesions are noted. Normal vaginal discharge present, no significant cystocele, rectocele or enterocele noted. Uterus nongravid and without CMT and adnexa nontender and without abnormal masses bilaterally. Extremities:  FROM and nontender without clubbing cyanosis or edema. ASSESSMENT:         ICD-10-CM    1. Encounter for gynecological examination without abnormal finding  Z01.419       2. Visit for screening mammogram  Z12.31                       PLAN:    Return to the office in 1 year or when necessary    Liliana Alonso M.D., Joshua Ray. [facility]  Lauren

## 2022-07-23 NOTE — PATIENT INSTRUCTIONS
Please get your mammogram done as scheduled. We will contact you with that result as well as today's Pap smear. Return to the office in 1 year or as needed. Safe travels this summer and have a wonderful year!

## 2022-07-25 ENCOUNTER — OFFICE VISIT (OUTPATIENT)
Dept: OBGYN CLINIC | Age: 62
End: 2022-07-25
Payer: COMMERCIAL

## 2022-07-25 VITALS — DIASTOLIC BLOOD PRESSURE: 70 MMHG | BODY MASS INDEX: 20.12 KG/M2 | SYSTOLIC BLOOD PRESSURE: 122 MMHG | WEIGHT: 110 LBS

## 2022-07-25 DIAGNOSIS — Z86.19 HISTORY OF PCR DNA POSITIVE FOR HSV1: ICD-10-CM

## 2022-07-25 DIAGNOSIS — Z01.419 ENCOUNTER FOR GYNECOLOGICAL EXAMINATION WITHOUT ABNORMAL FINDING: Primary | ICD-10-CM

## 2022-07-25 DIAGNOSIS — Z12.31 VISIT FOR SCREENING MAMMOGRAM: ICD-10-CM

## 2022-07-25 PROCEDURE — 99396 PREV VISIT EST AGE 40-64: CPT | Performed by: OBSTETRICS & GYNECOLOGY

## 2022-07-25 RX ORDER — ALENDRONATE SODIUM 70 MG/1
70 TABLET ORAL
COMMUNITY

## 2022-08-02 DIAGNOSIS — Z01.419 ENCOUNTER FOR GYNECOLOGICAL EXAMINATION WITHOUT ABNORMAL FINDING: ICD-10-CM

## 2022-09-13 DIAGNOSIS — Z12.31 VISIT FOR SCREENING MAMMOGRAM: ICD-10-CM

## 2023-01-27 ENCOUNTER — OFFICE VISIT (OUTPATIENT)
Dept: ORTHOPEDIC SURGERY | Age: 63
End: 2023-01-27
Payer: COMMERCIAL

## 2023-01-27 DIAGNOSIS — M25.559 HIP PAIN: Primary | ICD-10-CM

## 2023-01-27 PROCEDURE — G8484 FLU IMMUNIZE NO ADMIN: HCPCS | Performed by: ORTHOPAEDIC SURGERY

## 2023-01-27 PROCEDURE — 99213 OFFICE O/P EST LOW 20 MIN: CPT | Performed by: ORTHOPAEDIC SURGERY

## 2023-01-27 PROCEDURE — 3017F COLORECTAL CA SCREEN DOC REV: CPT | Performed by: ORTHOPAEDIC SURGERY

## 2023-01-27 PROCEDURE — 1036F TOBACCO NON-USER: CPT | Performed by: ORTHOPAEDIC SURGERY

## 2023-01-27 PROCEDURE — G8428 CUR MEDS NOT DOCUMENT: HCPCS | Performed by: ORTHOPAEDIC SURGERY

## 2023-01-27 PROCEDURE — G8420 CALC BMI NORM PARAMETERS: HCPCS | Performed by: ORTHOPAEDIC SURGERY

## 2023-01-27 NOTE — PROGRESS NOTES
Shira Rubi M.D.            [address] [place] [address], [address] [facility], [address] [facility]           Dept Phone: [phone]           Dept Fax:  [phone]           [place]          Dept Phone: [phone]           Dept Fax:  [phone]      Chief Compliant:  Chief Complaint   Patient presents with    Pain     Lt ASI 1 YR        History of Present Illness: This is a 65 y.o. female who presents to the clinic today for evaluation / follow up of left total hip x21 years. Patient is a 16-year-old female who had a left total of arthroplasty performed 21 years ago for acetabular dysplasia. She she is following on a routine basis to assess for any polyethylene wear she states that she minimally has any pain or discomfort still remains very active in the gym. Review of Systems   Constitutional: Negative for fever, chills, sweats. Eyes: Negative for changes in vision, or pain. HENT: Negative for ear ache, epistaxis, or sore throat. Respiratory/Cardio: Negative for Chest pain, palpitations, SOB, or cough. Gastrointestinal: Negative for abdominal pain, N/V/D. Genitourinary: Negative for dysuria, frequency, urgency, or hematuria. Neurological: Negative for headache, numbness, or weakness. Integumentary: Negative for rash, itching, laceration, or abrasion. Musculoskeletal: Positive for Pain (Lt ASI 1 YR)       Physical Exam:  Constitutional: Patient is oriented to person, place, and time. Patient appears well-developed and well nourished. HENT: Negative otherwise noted  Head: Normocephalic and Atraumatic  Nose: Normal  Eyes: Conjunctivae and EOM are normal  Neck: Normal range of motion Neck supple. Respiratory/Cardio: Effort normal. No respiratory distress.   Musculoskeletal: Physical examination [place] motion the patient's hip indiscriminately without any discomfort whatsoever. She has internal and external rotation without any pain on KATELYNN or FADIR. She has good active strength no leg length discrepancy. Neurological: Patient is alert and oriented to person, place, and time. Normal strength. No sensory deficit. Skin: Skin is warm and dry  Psychiatric: Behavior is normal. Thought content normal.  Nursing note and vitals reviewed. Labs and Imaging:     XR taken today:  XR PELVIS (1-2 VIEWS)    Result Date: 1/27/2023  Trays taken today reviewed by me show AP of the pelvis. Patient status post left total hip arthroplasty with a cerclage cable and 2 acetabular screws. Patient has slight femoral head E centricity but there is been no essential change in today's films versus that a year ago. There is no evidence for loosening of the femoral component. Patient does have a some cyst associated with the screws of the acetabulum but this is nonprogressive from views taken a year ago          Orders Placed This Encounter   Procedures    XR PELVIS (1-2 VIEWS)     Standing Status:   Future     Number of Occurrences:   1     Standing Expiration Date:   1/27/2024       Assessment and Plan:  21-year status post left total hip with minimal polyethylene wear        This is a 65 y.o. female who presents to the clinic today for evaluation / follow up of 1 year status post left total hip.      Past History:    Current Outpatient Medications:     alendronate (FOSAMAX) 70 MG tablet, Take 70 mg by mouth every 7 days, Disp: , Rfl:     valACYclovir (VALTREX) 1 g tablet, TAKE ONE TABLET BY MOUTH TWICE DAILY AS DIRECTED FOR SEVEN DAYS, Disp: 14 tablet, Rfl: 5    TURMERIC PO, Take by mouth, Disp: , Rfl:     Omega 3-6-9 Fatty Acids (OMEGA 3-6-9 COMPLEX PO), Take  by mouth., Disp: , Rfl:     Probiotic Product (PROBIOTIC PO), Take  by mouth., Disp: , Rfl:     Calcium Carbonate-Vitamin D (CALCIUM + D PO), Take  by mouth., Disp: , Rfl:     levothyroxine (SYNTHROID) 50 MCG tablet, Take 75 mcg by mouth in the morning. ., Disp: , Rfl:   Allergies   Allergen Reactions    Mixed Ragweed     Molds & Smuts      Social History     Socioeconomic History    Marital status: Single     Spouse name: Not on file    Number of children: Not on file    Years of education: Not on file    Highest education level: Not on file   Occupational History    Not on file   Tobacco Use    Smoking status: Never    Smokeless tobacco: Never   Vaping Use    Vaping Use: Never used   Substance and Sexual Activity    Alcohol use:  Yes     Alcohol/week: 3.0 standard drinks     Types: 3 Glasses of wine per week    Drug use: No    Sexual activity: Yes     Partners: Male   Other Topics Concern    Not on file   Social History Narrative    Not on file     Social Determinants of Health     Financial Resource Strain: Not on file   Food Insecurity: Not on file   Transportation Needs: Not on file   Physical Activity: Not on file   Stress: Not on file   Social Connections: Not on file   Intimate Partner Violence: Not on file   Housing Stability: Not on file     Past Medical History:   Diagnosis Date    Abnormal Pap smear 10/12/2001    REAGAN I, mild dysplasia    GERD (gastroesophageal reflux disease)     history of    Hypothyroidism     Migraine     Osteopenia 03/2008     Past Surgical History:   Procedure Laterality Date    COLONOSCOPY  02/2017    COLPOSCOPY  12/2001    EYE SURGERY Right 11/2018    cornea replacement     OTHER SURGICAL HISTORY  3/9/90    cervical cone by laser ablation    TOTAL HIP ARTHROPLASTY      Left Hip     Family History   Problem Relation Age of Onset    Hypertension Paternal Grandmother     Hypertension Maternal Grandmother     Cancer Father         thyroid    Depression Father     Heart Disease Father     Osteoporosis Mother     High Blood Pressure Mother     High Cholesterol Mother     Hypertension Brother     Breast Cancer Other    Plan  Inform the patient that we did see some early polyethylene wear but certainly nothing too severe there is been no progressive change in x-rays from last year as such I could feel we can continue to follow her on a biannual basis call if her symptoms exacerbate      Provider Attestation:  [place], personally performed the services described in this documentation. All medical record entries made by the scribe were at my direction and in my presence. I have reviewed the chart and discharge instructions and agree that the records reflect my personal performance and is accurate and complete. Daniel Starr MD. 01/27/23      Please note that this chart was generated using voice recognition Dragon dictation software. Although every effort was made to ensure the accuracy of this automated transcription, some errors in transcription may have occurred.

## 2023-03-31 ENCOUNTER — TELEPHONE (OUTPATIENT)
Dept: ORTHOPEDIC SURGERY | Age: 63
End: 2023-03-31

## 2023-03-31 NOTE — TELEPHONE ENCOUNTER
This patient is wondering if she can use an infrared sauna. She had a left GADIEL on 5/14/2001. Please advise.

## 2023-03-31 NOTE — TELEPHONE ENCOUNTER
Pt called stating she has surgery with dr Irina Bingham 05/14/01 and wants to know if she is able to use a infrared sauna due to the metal plate she is unsure if that's something shes able to do with the metal plate she has

## 2023-08-21 ENCOUNTER — OFFICE VISIT (OUTPATIENT)
Dept: OBGYN CLINIC | Age: 63
End: 2023-08-21
Payer: COMMERCIAL

## 2023-08-21 ENCOUNTER — HOSPITAL ENCOUNTER (OUTPATIENT)
Age: 63
Setting detail: SPECIMEN
Discharge: HOME | End: 2023-08-21

## 2023-08-21 VITALS
DIASTOLIC BLOOD PRESSURE: 70 MMHG | SYSTOLIC BLOOD PRESSURE: 120 MMHG | WEIGHT: 110 LBS | HEART RATE: 65 BPM | HEIGHT: 62 IN | BODY MASS INDEX: 20.24 KG/M2

## 2023-08-21 DIAGNOSIS — M81.0 OSTEOPOROSIS, POST-MENOPAUSAL: ICD-10-CM

## 2023-08-21 DIAGNOSIS — Z12.31 ENCOUNTER FOR SCREENING MAMMOGRAM FOR MALIGNANT NEOPLASM OF BREAST: Primary | ICD-10-CM

## 2023-08-21 DIAGNOSIS — Z01.419 ENCOUNTER FOR GYNECOLOGICAL EXAMINATION WITHOUT ABNORMAL FINDING: ICD-10-CM

## 2023-08-21 PROCEDURE — 99396 PREV VISIT EST AGE 40-64: CPT | Performed by: OBSTETRICS & GYNECOLOGY

## 2023-08-21 NOTE — PATIENT INSTRUCTIONS
Please get your mammogram done as scheduled. We will contact you with that result as well as today's Pap smear. Return to the office in 1 year or as needed. Enjoy the rest of your summer and have a fantastic year!

## 2023-08-21 NOTE — PROGRESS NOTES
[facility]  [facility] Naomi Miranda  [address] [place] [address]       DATE OF VISIT:  23        History and Physical    Anastasia Smith    :  1960  CHIEF COMPLAINT:    Chief Complaint   Patient presents with    Gynecologic Exam                    HPI :   Anastasia Smith is a 65 y.o. femaleGRAVIDA 5 PARA [phone]   PCP: Mayra Mcfadden, APRN - [place]    Anastasia Smith returns today for her annual exam.  She is also due for her mammogram.  Last DEXA scan revealed osteoporosis and she is now doing Prolia infusions twice yearly. Last infusion was 2023. Next colonoscopy is due in . She is having regular bowel movements without constipation or diarrhea. She denies any UTI symptoms or significant involuntary loss of urine. She is otherwise without any significant complaints today.   Lisset David is still working as a  at [employer] and will probably retire sometime after next year.  _____________________________________________________________________  Past Medical History:   Diagnosis Date    Abnormal Pap smear 10/12/2001    REAGAN I, mild dysplasia    GERD (gastroesophageal reflux disease)     history of    Hypothyroidism     Migraine     Osteopenia 2008                                                                   Past Surgical History:   Procedure Laterality Date    COLONOSCOPY  2017    COLPOSCOPY  2001    EYE SURGERY Right 2018    cornea replacement     OTHER SURGICAL HISTORY  3/9/90    cervical cone by laser ablation    TOTAL HIP ARTHROPLASTY      Left Hip     Family History   Problem Relation Age of Onset    Hypertension Paternal Grandmother     Hypertension Maternal Grandmother     Cancer Father         thyroid    Depression Father     Heart Disease Father     Osteoporosis Mother     High Blood Pressure Mother     High Cholesterol Mother     Hypertension Brother     Breast Cancer Other      Social History

## 2023-08-24 LAB
HPV I/H RISK 4 DNA CVX QL NAA+PROBE: NOT DETECTED
HPV SAMPLE: NORMAL
HPV, INTERPRETATION: NORMAL
HPV16 DNA CVX QL NAA+PROBE: NOT DETECTED
HPV18 DNA CVX QL NAA+PROBE: NOT DETECTED
SPECIMEN DESCRIPTION: NORMAL

## 2023-08-29 LAB — CYTOLOGY REPORT: NORMAL

## 2023-11-06 DIAGNOSIS — Z12.31 ENCOUNTER FOR SCREENING MAMMOGRAM FOR MALIGNANT NEOPLASM OF BREAST: ICD-10-CM

## 2024-03-08 ENCOUNTER — OFFICE VISIT (OUTPATIENT)
Dept: ORTHOPEDIC SURGERY | Age: 64
End: 2024-03-08

## 2024-03-08 DIAGNOSIS — M25.552 LEFT HIP PAIN: Primary | ICD-10-CM

## 2024-03-08 NOTE — PROGRESS NOTES
[facility]                   Prasad Galvan M.D.            [address], [address]               [place], [place] [address]           Dept Phone: [phone]           Dept Fax:  [phone] [address] [facility]                       [address]           [place], [place] [address]          Dept Phone: [phone]           Dept Fax:  [phone]      Chief Compliant:  Chief Complaint   Patient presents with    Pain     Lt hip        History of Present Illness:  This is a 63 y.o. female who presents to the clinic today for evaluation / follow up of left total hip.  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  She is following a routine basis to assess the any polyethylene wear.  She occasionally gets the occasional discomfort but she says 95% the time she has no pain whatsoever and remains very active..       Review of Systems   Constitutional: Negative for fever, chills, sweats.   Eyes: Negative for changes in vision, or pain.   HENT: Negative for ear ache, epistaxis, or sore throat.  Respiratory/Cardio: Negative for Chest pain, palpitations, SOB, or cough.  Gastrointestinal: Negative for abdominal pain, N/V/D.   Genitourinary: Negative for dysuria, frequency, urgency, or hematuria.   Neurological: Negative for headache, numbness, or weakness.   Integumentary: Negative for rash, itching, laceration, or abrasion.   Musculoskeletal: Positive for Pain (Lt hip)       Physical Exam:  Constitutional: Patient is oriented to person, place, and time. Patient appears well-developed and well nourished.   HENT: Negative otherwise noted  Head: Normocephalic and Atraumatic  Nose: Normal  Eyes: Conjunctivae and EOM are normal  Neck: Normal range of motion Neck supple.    Respiratory/Cardio: Effort normal. No respiratory distress.  Musculoskeletal: Examination notes the patient has no pain whatsoever with internal ex rotation and with her doing

## 2024-07-15 ENCOUNTER — OFFICE VISIT (OUTPATIENT)
Dept: ORTHOPEDIC SURGERY | Age: 64
End: 2024-07-15
Payer: COMMERCIAL

## 2024-07-15 VITALS — BODY MASS INDEX: 19.69 KG/M2 | HEIGHT: 62 IN | WEIGHT: 107 LBS | RESPIRATION RATE: 14 BRPM

## 2024-07-15 DIAGNOSIS — M25.552 LEFT HIP PAIN: Primary | ICD-10-CM

## 2024-07-15 DIAGNOSIS — Z96.642 HISTORY OF LEFT HIP REPLACEMENT: ICD-10-CM

## 2024-07-15 PROCEDURE — G8420 CALC BMI NORM PARAMETERS: HCPCS | Performed by: PHYSICIAN ASSISTANT

## 2024-07-15 PROCEDURE — 1036F TOBACCO NON-USER: CPT | Performed by: PHYSICIAN ASSISTANT

## 2024-07-15 PROCEDURE — 99213 OFFICE O/P EST LOW 20 MIN: CPT | Performed by: PHYSICIAN ASSISTANT

## 2024-07-15 PROCEDURE — G8427 DOCREV CUR MEDS BY ELIG CLIN: HCPCS | Performed by: PHYSICIAN ASSISTANT

## 2024-07-15 PROCEDURE — 3017F COLORECTAL CA SCREEN DOC REV: CPT | Performed by: PHYSICIAN ASSISTANT

## 2024-07-15 RX ORDER — MELOXICAM 15 MG/1
15 TABLET ORAL DAILY
Qty: 30 TABLET | Refills: 3 | Status: SHIPPED | OUTPATIENT
Start: 2024-07-15

## 2024-07-15 RX ORDER — NYSTATIN 500000 [USP'U]/1
TABLET, COATED ORAL
COMMUNITY
Start: 2024-06-26

## 2024-07-15 NOTE — PROGRESS NOTES
cornea replacement     OTHER SURGICAL HISTORY  3/9/90    cervical cone by laser ablation    TOTAL HIP ARTHROPLASTY      Left Hip     Family History   Problem Relation Age of Onset    Hypertension Paternal Grandmother     Hypertension Maternal Grandmother     Cancer Father         thyroid    Depression Father     Heart Disease Father     Osteoporosis Mother     High Blood Pressure Mother     High Cholesterol Mother     Hypertension Brother     Breast Cancer Other         Review of Systems   Constitutional: Negative for fever, chills, sweats.   Eyes: Negative for changes in vision, or pain.   HENT: Negative for ear ache, epistaxis, or sore throat.  Respiratory/Cardio: Negative for Chest pain, palpitations, SOB, or cough.  Gastrointestinal: Negative for abdominal pain, N/V/D.   Genitourinary: Negative for dysuria, frequency, urgency, or hematuria.   Neurological: Negative for headache, numbness, or weakness.   Integumentary: Negative for rash, itching, laceration, or abrasion.   Musculoskeletal: Positive for Follow-up (Lt hip pain )       Physical Exam:  Constitutional: Patient is oriented to person, place, and time. Patient appears well-developed and well nourished.   HENT: Negative otherwise noted  Head: Normocephalic and Atraumatic  Nose: Normal  Eyes: Conjunctivae and EOM are normal  Neck: Normal range of motion Neck supple.    Respiratory/Cardio: Effort normal. No respiratory distress.  Musculoskeletal:    leftHip    Tenderness: No significant tenderness to the left gluteal insertions, left greater trochanter or to the trochanteric bursa.  Some mild tenderness to the anterior hip especially over the hip flexor region.  No tenderness to the left SI joint or low back.       Range of Motion:      Extension: 10     Flexion: 80     Internal Rotation: 5     External Rotation: 10     Abduction: 15     Adduction:  20       Muscle Strength      Abduction: 5/5     Adduction: 5/5     Flexion: 4/5         Gait: Antalgic

## 2024-07-19 ENCOUNTER — HOSPITAL ENCOUNTER (OUTPATIENT)
Dept: CT IMAGING | Age: 64
Discharge: HOME | End: 2024-07-19
Payer: COMMERCIAL

## 2024-07-19 DIAGNOSIS — M25.552 LEFT HIP PAIN: ICD-10-CM

## 2024-07-19 DIAGNOSIS — Z96.642 HISTORY OF LEFT HIP REPLACEMENT: ICD-10-CM

## 2024-07-19 PROCEDURE — 73700 CT LOWER EXTREMITY W/O DYE: CPT

## 2024-07-23 ENCOUNTER — TELEPHONE (OUTPATIENT)
Dept: ORTHOPEDIC SURGERY | Age: 64
End: 2024-07-23

## 2024-07-24 ENCOUNTER — TELEPHONE (OUTPATIENT)
Dept: ORTHOPEDIC SURGERY | Age: 64
End: 2024-07-24

## 2024-07-24 NOTE — TELEPHONE ENCOUNTER
Received call from patient requesting her records from 23 years ago to present be faxed to Dr. Quiros office so she can make an appt.      Please fax to: [phone]    ------------------    Patient would like to know if there are any other revision specalists in the area besides Dr. Patel? She would also like to go to the [facility].      Patient call back#: [phone]

## 2024-07-24 NOTE — TELEPHONE ENCOUNTER
Gave patient HIM number to call and request records. Also made her an appt with Dr. Salguero for her back as she stated this has been become an issue.

## 2024-07-25 ENCOUNTER — OFFICE VISIT (OUTPATIENT)
Dept: ORTHOPEDIC SURGERY | Age: 64
End: 2024-07-25
Payer: COMMERCIAL

## 2024-07-25 VITALS — WEIGHT: 107 LBS | BODY MASS INDEX: 19.69 KG/M2 | HEIGHT: 62 IN | RESPIRATION RATE: 14 BRPM

## 2024-07-25 DIAGNOSIS — M89.50 OSTEOLYSIS AFTER SURGICAL PROCEDURE ON SKELETAL SYSTEM: ICD-10-CM

## 2024-07-25 DIAGNOSIS — Z96.642 HISTORY OF LEFT HIP REPLACEMENT: ICD-10-CM

## 2024-07-25 DIAGNOSIS — M25.552 LEFT HIP PAIN: ICD-10-CM

## 2024-07-25 DIAGNOSIS — M54.50 LUMBAR PAIN: Primary | ICD-10-CM

## 2024-07-25 DIAGNOSIS — M96.89 OSTEOLYSIS AFTER SURGICAL PROCEDURE ON SKELETAL SYSTEM: ICD-10-CM

## 2024-07-25 PROCEDURE — 1036F TOBACCO NON-USER: CPT | Performed by: ORTHOPAEDIC SURGERY

## 2024-07-25 PROCEDURE — G8420 CALC BMI NORM PARAMETERS: HCPCS | Performed by: ORTHOPAEDIC SURGERY

## 2024-07-25 PROCEDURE — 3017F COLORECTAL CA SCREEN DOC REV: CPT | Performed by: ORTHOPAEDIC SURGERY

## 2024-07-25 PROCEDURE — G8428 CUR MEDS NOT DOCUMENT: HCPCS | Performed by: ORTHOPAEDIC SURGERY

## 2024-07-25 PROCEDURE — 99213 OFFICE O/P EST LOW 20 MIN: CPT | Performed by: ORTHOPAEDIC SURGERY

## 2024-07-25 NOTE — PROGRESS NOTES
Patient ID: Shadia Clark is a 63 y.o. female    Chief Compliant:  No chief complaint on file.       Diagnostic imaging:  AP lateral lumbar spine degenerative scoliosis most impressive finding is status post left total hip arthroplasty with very significant osteolysis around the acetabular component          Assessment and Plan:  1. Lumbar pain    2. History of left hip replacement    3. Left hip pain    4. 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 Gonzales before August 18.    I sent Dr. Gonzales text message via [employer] with pictures with request that he get her in sooner  S/p arthroplasty performed 23 years ago.    Advised to continue ambulating with crutches    Refer to Dr. Gonzales for surgery    Follow up prn    HPI:  This is a 63 y.o. female who presents to the clinic today for left sided back and buttock pain which radiates to anterior left hip    Patient reports a few weeks ago pain has worsened and is unable to ambulate without crutches now. Advised patient thoroughly that she will need THR immediately.    Patient ambulates via crutches      Review of Systems   All other systems reviewed and are negative.      Past History:    Current Outpatient Medications:     nystatin (MYCOSTATIN) 575588 units TABS, TAKE ONE TABLET BY MOUTH FOUR TIMES DAILY, Disp: , Rfl:     meloxicam (MOBIC) 15 MG tablet, Take 1 tablet by mouth daily, Disp: 30 tablet, Rfl: 3    Handicap Placard [facility], by Does not apply route Patient is unable to walk more than 100 feet without stopping to rest. Valid from 7/15/24 to 1/15/25., Disp: 1 each, Rfl: 0    valACYclovir (VALTREX) 1 g tablet, TAKE ONE TABLET BY MOUTH TWICE DAILY

## 2024-07-26 NOTE — TELEPHONE ENCOUNTER
Patient need notes providers notes fax to Dr. Quiros office so she can make an appointment as soon as possible.     Dr. Galvan visit 3/8/24 & 3/24/24  WILLY Leon visit 7/15/24  Dr. Salguero visit: 7/25/24     Also patient is having trouble getting medical records from hip surgery with Dr. Galvan about 23 years ago and unable to get appointment until the records and notes are receive by Dr. Quiros.         Please fax to: [phone]     Please give patient a call [phone]

## 2024-07-26 NOTE — TELEPHONE ENCOUNTER
Patient need notes providers notes fax to Dr. Quiros office so she can make an appointment as soon as possible.    Dr. Galvan visit 3/8/24 & 3/24/24  WILLY Leon visit 7/15/24  Dr. Salguero visit: 7/25/24    Also patient is having trouble getting medical records from hip surgery with Dr. Galvan about 23 years ago and unable to get appointment until the records and notes are receive by Dr. Quiros.         Please fax to: [phone]    Please give patient a call

## 2024-08-07 ENCOUNTER — TELEPHONE (OUTPATIENT)
Dept: ORTHOPEDIC SURGERY | Age: 64
End: 2024-08-07

## 2024-08-07 NOTE — TELEPHONE ENCOUNTER
Pt called in regarding a surg she had on her lt hip with Dr. Galvan 23 yrs ago.     Has been referred to another Dr with [facility] but they need to know the type of device that Dr. Galvan used in her hip. Has contacted Medical Records  but was told after 10 yrs all records are purged.    States she just had an appt with Dr. Salguero to look at her back an he did a CT Scan and was the device and mentioned the name but she didn't write it down so is asking if Dr. Galvan could please look at this CT Scan and tell her what the name of the device is and call her back so she can give it to the new Dr.    If Dr. Galvan is not going to be aval if Dr. Salguero could please re-look at it and tell Triston what it is so he can advise the pt. Has and appt on Friday @ the [facility]        Please call pt back with info @ [phone]

## 2024-08-08 NOTE — TELEPHONE ENCOUNTER
Called patient to notify her of her implant devices name as she requested. Dr Salguero stated it was Biomet dalton taper.

## 2024-09-03 ENCOUNTER — HOSPITAL ENCOUNTER (OUTPATIENT)
Age: 64
Setting detail: SPECIMEN
Discharge: HOME | End: 2024-09-03

## 2024-09-03 ENCOUNTER — OFFICE VISIT (OUTPATIENT)
Dept: OBGYN CLINIC | Age: 64
End: 2024-09-03
Payer: COMMERCIAL

## 2024-09-03 VITALS — WEIGHT: 107 LBS | BODY MASS INDEX: 19.57 KG/M2 | DIASTOLIC BLOOD PRESSURE: 78 MMHG | SYSTOLIC BLOOD PRESSURE: 118 MMHG

## 2024-09-03 DIAGNOSIS — Z86.19 HISTORY OF PCR DNA POSITIVE FOR HSV1: ICD-10-CM

## 2024-09-03 DIAGNOSIS — Z12.31 VISIT FOR SCREENING MAMMOGRAM: ICD-10-CM

## 2024-09-03 DIAGNOSIS — M81.0 POST-MENOPAUSAL OSTEOPOROSIS: ICD-10-CM

## 2024-09-03 DIAGNOSIS — Z01.419 ENCOUNTER FOR GYNECOLOGICAL EXAMINATION WITHOUT ABNORMAL FINDING: Primary | ICD-10-CM

## 2024-09-03 PROCEDURE — 99396 PREV VISIT EST AGE 40-64: CPT | Performed by: OBSTETRICS & GYNECOLOGY

## 2024-09-03 PROCEDURE — 99459 PELVIC EXAMINATION: CPT | Performed by: OBSTETRICS & GYNECOLOGY

## 2024-09-03 ASSESSMENT — PATIENT HEALTH QUESTIONNAIRE - PHQ9
SUM OF ALL RESPONSES TO PHQ QUESTIONS 1-9: 0
SUM OF ALL RESPONSES TO PHQ9 QUESTIONS 1 & 2: 0
SUM OF ALL RESPONSES TO PHQ QUESTIONS 1-9: 0
2. FEELING DOWN, DEPRESSED OR HOPELESS: NOT AT ALL
SUM OF ALL RESPONSES TO PHQ QUESTIONS 1-9: 0
SUM OF ALL RESPONSES TO PHQ QUESTIONS 1-9: 0
1. LITTLE INTEREST OR PLEASURE IN DOING THINGS: NOT AT ALL

## 2024-09-03 NOTE — PATIENT INSTRUCTIONS
Please get your mammogram done as scheduled.  We will contact you with that result as well as today's Pap smear.  Plan and return in 1 year.  Good luck with your upcoming surgery, stay healthy and have a wonderful year!

## 2024-09-03 NOTE — PROGRESS NOTES
[facility]  [facility]  [address]  [address]  [facility] [address]       DATE OF VISIT:  9/3/24        History and Physical    Shadia Clark    :  1960  CHIEF COMPLAINT:    Chief Complaint   Patient presents with    Annual Exam     Here for annual last pap 23 neg  last mammogram 10/31/23 neg  last dexa 20 osteopenia                     HPI :     Shadia Clark is a 63 y.o. femaleGRAVIDA 5 PARA 3023   PCP: Mariaelena Gibson, RODRIGO - [place]    Shadia Clark returns today for her annual exam.  Recent DEXA scan done by her orthopedic surgeon shows some slight improvement of her osteoporosis.  She is having a left hip revision at [facility] next week.  Still receiving Prolia and has a new endocrinologist at [facility].  She is having regular bowel movements without constipation or diarrhea.  She denies any UTI symptoms or involuntary loss of urine.  She is otherwise without any significant complaints today.  Sherly is still working as a  at [employer] but is thinking about retiring and moving to [place] where she has a new granddaughter.  _____________________________________________________________________  Past Medical History:   Diagnosis Date    Abnormal Pap smear 10/12/2001    REAGAN I, mild dysplasia    GERD (gastroesophageal reflux disease)     history of    Hypothyroidism     Migraine     Osteopenia 2008                                                                   Past Surgical History:   Procedure Laterality Date    COLONOSCOPY  2017    COLPOSCOPY  2001    EYE SURGERY Right 2018    cornea replacement     OTHER SURGICAL HISTORY  3/9/90    cervical cone by laser ablation    TOTAL HIP ARTHROPLASTY      Left Hip     Family History   Problem Relation Age of Onset    Hypertension Paternal Grandmother     Hypertension Maternal Grandmother     Cancer Father         thyroid    Depression Father

## 2024-09-12 LAB — CYTOLOGY REPORT: NORMAL

## 2024-11-03 ENCOUNTER — HOSPITAL ENCOUNTER (OUTPATIENT)
Dept: SLEEP CENTER | Age: 64
Discharge: HOME | End: 2024-11-05
Payer: COMMERCIAL

## 2024-11-03 VITALS — BODY MASS INDEX: 19.69 KG/M2 | HEIGHT: 62 IN | WEIGHT: 107 LBS

## 2024-11-03 PROCEDURE — 95810 POLYSOM 6/> YRS 4/> PARAM: CPT

## 2024-11-11 DIAGNOSIS — Z12.31 VISIT FOR SCREENING MAMMOGRAM: ICD-10-CM

## 2024-11-13 LAB — STATUS: NORMAL

## 2024-12-01 ENCOUNTER — HOSPITAL ENCOUNTER (OUTPATIENT)
Dept: SLEEP CENTER | Age: 64
Discharge: HOME | End: 2024-12-03
Payer: COMMERCIAL

## 2024-12-01 PROCEDURE — 95811 POLYSOM 6/>YRS CPAP 4/> PARM: CPT

## 2025-06-07 ENCOUNTER — HOSPITAL ENCOUNTER (OUTPATIENT)
Dept: RADIOLOGY | Facility: CLINIC | Age: 65
Discharge: HOME | End: 2025-06-07
Payer: COMMERCIAL

## 2025-06-07 DIAGNOSIS — Z82.49 FAMILY HISTORY OF ISCHEMIC HEART DISEASE AND OTHER DISEASES OF THE CIRCULATORY SYSTEM: ICD-10-CM

## 2025-06-07 DIAGNOSIS — N95.1 MENOPAUSAL AND FEMALE CLIMACTERIC STATES: ICD-10-CM

## 2025-06-07 PROCEDURE — 75571 CT HRT W/O DYE W/CA TEST: CPT

## 2025-09-05 ENCOUNTER — HOSPITAL ENCOUNTER (OUTPATIENT)
Age: 65
Setting detail: SPECIMEN
Discharge: HOME | End: 2025-09-05